# Patient Record
Sex: FEMALE | URBAN - METROPOLITAN AREA
[De-identification: names, ages, dates, MRNs, and addresses within clinical notes are randomized per-mention and may not be internally consistent; named-entity substitution may affect disease eponyms.]

---

## 2022-08-22 ENCOUNTER — APPOINTMENT (RX ONLY)
Dept: URBAN - METROPOLITAN AREA CLINIC 43 | Facility: CLINIC | Age: 27
Setting detail: DERMATOLOGY
End: 2022-08-22

## 2022-08-22 DIAGNOSIS — L23.9 ALLERGIC CONTACT DERMATITIS, UNSPECIFIED CAUSE: ICD-10-CM | Status: INADEQUATELY CONTROLLED

## 2022-08-22 PROCEDURE — ? PRESCRIPTION MEDICATION MANAGEMENT

## 2022-08-22 PROCEDURE — ? COUNSELING

## 2022-08-22 PROCEDURE — ? PRESCRIPTION

## 2022-08-22 PROCEDURE — 99203 OFFICE O/P NEW LOW 30 MIN: CPT

## 2022-08-22 PROCEDURE — ? ADDITIONAL NOTES

## 2022-08-22 RX ORDER — TACROLIMUS 1 MG/G
OINTMENT TOPICAL BID
Qty: 100 | Refills: 3 | Status: CANCELLED | COMMUNITY
Start: 2022-08-22

## 2022-08-22 RX ADMIN — TACROLIMUS: 1 OINTMENT TOPICAL at 00:00

## 2022-08-22 ASSESSMENT — LOCATION SIMPLE DESCRIPTION DERM
LOCATION SIMPLE: LEFT CHEEK
LOCATION SIMPLE: RIGHT CHEEK

## 2022-08-22 ASSESSMENT — LOCATION ZONE DERM: LOCATION ZONE: FACE

## 2022-08-22 ASSESSMENT — LOCATION DETAILED DESCRIPTION DERM
LOCATION DETAILED: RIGHT SUPERIOR MEDIAL BUCCAL CHEEK
LOCATION DETAILED: LEFT SUPERIOR MEDIAL BUCCAL CHEEK

## 2022-08-22 NOTE — HPI: RASH
What Type Of Note Output Would You Prefer (Optional)?: Standard Output
Is This A New Presentation, Or A Follow-Up?: Rash
Additional History: Pt has tried changing skin care routine, diet etc with no improvement.

## 2022-08-24 RX ORDER — TACROLIMUS 1 MG/G
OINTMENT TOPICAL BID
Qty: 100 | Refills: 3 | Status: ERX